# Patient Record
(demographics unavailable — no encounter records)

---

## 2017-05-26 NOTE — RAD
Indication degenerative joint disease. Shoulder pain. Neck pain. Chronic.



AP and lateral views of the cervical spine were obtained as well as an

odontoid view.



C1 through the upper thoracic spine are identified. There is disc space

narrowing at C5-6 and C6-7. Small osteophytes are seen at these levels as

well. There is very minimal anterolisthesis of C4 relative to C5. There are

facet degenerative changes seen at multiple levels. There is slight wedging

involving C5. An acute finding is not apparent. The prevertebral soft tissues

appear normal.



IMPRESSION: Spondylitic changes. No acute finding seen

## 2018-04-26 NOTE — RAD
Bone Densitometry 4/26/2018



History: Postmenopausal screening exam



Comparison study: 1 densitometry April 12, 2016.



Findings:



Bone Densitometry was performed with dual photon absorption of the lumbar

spine and proximal femurs. 



Lumbar Spine:



Bone density is 0.863 g/cm2 for L1-L4. T-score is -2.6.  (Prior T score -3.0)



Left femoral neck bone density: 0.766 g/cm2.  T-score is -2.0.  (Prior T score

-1.9)



IMPRESSION:

1. Osteoporosis of the lumbar spine.  T score has mildly improved in the

interim.  

2. Osteopenia of the left femoral neck, grossly similar to comparison study





[employer] definition of osteoporosis and osteopenia for

 women: normal equals T score at or above -1.0 standard deviations;

osteopenia equals T score between -1.0 and -2.5 standard deviations;

osteoporosis equals T score at or below -2.5 standard deviations.